# Patient Record
Sex: MALE | Race: WHITE | NOT HISPANIC OR LATINO | URBAN - METROPOLITAN AREA
[De-identification: names, ages, dates, MRNs, and addresses within clinical notes are randomized per-mention and may not be internally consistent; named-entity substitution may affect disease eponyms.]

---

## 2022-10-04 ENCOUNTER — APPOINTMENT (RX ONLY)
Dept: URBAN - METROPOLITAN AREA CLINIC 143 | Facility: CLINIC | Age: 77
Setting detail: DERMATOLOGY
End: 2022-10-04

## 2022-10-04 DIAGNOSIS — L28.1 PRURIGO NODULARIS: ICD-10-CM

## 2022-10-04 DIAGNOSIS — L29.89 OTHER PRURITUS: ICD-10-CM | Status: INADEQUATELY CONTROLLED

## 2022-10-04 PROBLEM — L29.8 OTHER PRURITUS: Status: ACTIVE | Noted: 2022-10-04

## 2022-10-04 PROCEDURE — ? COUNSELING

## 2022-10-04 PROCEDURE — ? PRESCRIPTION MEDICATION MANAGEMENT

## 2022-10-04 PROCEDURE — 99214 OFFICE O/P EST MOD 30 MIN: CPT

## 2022-10-04 PROCEDURE — ? PRESCRIPTION

## 2022-10-04 PROCEDURE — ? ADDITIONAL NOTES

## 2022-10-04 PROCEDURE — ? DIAGNOSIS COMMENT

## 2022-10-04 RX ORDER — HYDROXYZINE HYDROCHLORIDE 25 MG/1
1-2 TABLET, FILM COATED ORAL QHS
Qty: 60 | Refills: 2 | Status: ERX | COMMUNITY
Start: 2022-10-04

## 2022-10-04 RX ADMIN — HYDROXYZINE HYDROCHLORIDE 1-2: 25 TABLET, FILM COATED ORAL at 00:00

## 2022-10-04 ASSESSMENT — LOCATION SIMPLE DESCRIPTION DERM: LOCATION SIMPLE: LEFT FOREARM

## 2022-10-04 ASSESSMENT — LOCATION DETAILED DESCRIPTION DERM: LOCATION DETAILED: LEFT VENTRAL PROXIMAL FOREARM

## 2022-10-04 ASSESSMENT — LOCATION ZONE DERM: LOCATION ZONE: ARM

## 2022-10-04 NOTE — PROCEDURE: ADDITIONAL NOTES
Detail Level: Simple
Render Risk Assessment In Note?: no
Additional Notes: [email or web address] info printed and provided to pt. Recommended ice or frozen peas to area for comfort. Recommended cooler showers. Reviewed option for management by neurologist with gabapentin if interfering with his life. Pt declines at this time.

## 2022-10-04 NOTE — PROCEDURE: DIAGNOSIS COMMENT
Detail Level: Generalized
Render Risk Assessment In Note?: no
Comment: Pt states he went to urgent care for intense itching and was prescribed triamcinolone for area. Here today for follow up .

## 2022-10-04 NOTE — PROCEDURE: PRESCRIPTION MEDICATION MANAGEMENT
Plan: Hydroxyzine 1-2 tabs qhs for itching advised may make you sleepy , no driving.
Render In Strict Bullet Format?: No
Detail Level: Zone

## 2022-10-04 NOTE — PROCEDURE: MIPS QUALITY
Detail Level: Detailed
Quality 111:Pneumonia Vaccination Status For Older Adults: Pneumococcal vaccine (PPSV23) administered on or after patientâs 60th birthday and before the end of the measurement period

## 2024-05-21 ENCOUNTER — APPOINTMENT (RX ONLY)
Dept: URBAN - METROPOLITAN AREA CLINIC 146 | Facility: CLINIC | Age: 79
Setting detail: DERMATOLOGY
End: 2024-05-21

## 2024-05-21 DIAGNOSIS — D49.2 NEOPLASM OF UNSPECIFIED BEHAVIOR OF BONE, SOFT TISSUE, AND SKIN: ICD-10-CM

## 2024-05-21 DIAGNOSIS — L82.1 OTHER SEBORRHEIC KERATOSIS: ICD-10-CM

## 2024-05-21 PROCEDURE — ? BIOPSY BY SHAVE METHOD

## 2024-05-21 PROCEDURE — ? COUNSELING

## 2024-05-21 PROCEDURE — 11102 TANGNTL BX SKIN SINGLE LES: CPT

## 2024-05-21 PROCEDURE — 99212 OFFICE O/P EST SF 10 MIN: CPT | Mod: 25

## 2024-05-21 ASSESSMENT — LOCATION SIMPLE DESCRIPTION DERM
LOCATION SIMPLE: LEFT NOSE
LOCATION SIMPLE: LEFT PRETIBIAL REGION

## 2024-05-21 ASSESSMENT — LOCATION DETAILED DESCRIPTION DERM
LOCATION DETAILED: LEFT NASAL ALA
LOCATION DETAILED: LEFT PROXIMAL PRETIBIAL REGION

## 2024-05-21 ASSESSMENT — LOCATION ZONE DERM
LOCATION ZONE: NOSE
LOCATION ZONE: LEG

## 2024-05-21 NOTE — PROCEDURE: BIOPSY BY SHAVE METHOD
Detail Level: Detailed
Depth Of Biopsy: dermis
Was A Bandage Applied: Yes
Size Of Lesion In Cm: 0.4
X Size Of Lesion In Cm: 0
Anticipated Plan (Based On Presumed Biopsy Results): Ebt
Biopsy Type: H and E
Biopsy Method: double edge Personna blade
Anesthesia Type: 1% lidocaine without epinephrine
Anesthesia Volume In Cc: 0.5
Hemostasis: Aluminum Chloride
Wound Care: Aquaphor
Dressing: bandage
Destruction After The Procedure: No
Type Of Destruction Used: Curettage
Curettage Text: The wound bed was treated with curettage after the biopsy was performed.
Cryotherapy Text: The wound bed was treated with cryotherapy after the biopsy was performed.
Electrodesiccation Text: The wound bed was treated with electrodesiccation after the biopsy was performed.
Electrodesiccation And Curettage Text: The wound bed was treated with electrodesiccation and curettage after the biopsy was performed.
Silver Nitrate Text: The wound bed was treated with silver nitrate after the biopsy was performed.
Lab: -[id number]
Consent: Written consent was obtained and risks were reviewed including but not limited to scarring, infection, bleeding, scabbing, incomplete removal, nerve damage and allergy to anesthesia.
Post-Care Instructions: I reviewed with the patient in detail post-care instructions. Patient is to keep the biopsy site dry overnight, and then apply bacitracin twice daily until healed. Patient may apply hydrogen peroxide soaks to remove any crusting.
Notification Instructions: Patient will be notified of biopsy results. However, patient instructed to call the office if not contacted within 2 weeks.
Billing Type: Third-Party Bill
Information: Selecting Yes will display possible errors in your note based on the variables you have selected. This validation is only offered as a suggestion for you. PLEASE NOTE THAT THE VALIDATION TEXT WILL BE REMOVED WHEN YOU FINALIZE YOUR NOTE. IF YOU WANT TO FAX A PRELIMINARY NOTE YOU WILL NEED TO TOGGLE THIS TO 'NO' IF YOU DO NOT WANT IT IN YOUR FAXED NOTE.

## 2024-06-11 ENCOUNTER — APPOINTMENT (RX ONLY)
Dept: URBAN - METROPOLITAN AREA CLINIC 146 | Facility: CLINIC | Age: 79
Setting detail: DERMATOLOGY
End: 2024-06-11

## 2024-06-11 PROBLEM — C44.311 BASAL CELL CARCINOMA OF SKIN OF NOSE: Status: ACTIVE | Noted: 2024-06-11

## 2024-06-11 PROCEDURE — 99214 OFFICE O/P EST MOD 30 MIN: CPT

## 2024-06-11 PROCEDURE — ? PATIENT SPECIFIC COUNSELING

## 2024-06-11 PROCEDURE — ? CONSULTATION FOR ELECTRON BEAM THERAPY

## 2024-06-11 NOTE — HPI: BIOPSY PROVEN BCC
How Severe Is Your Bcc?: moderate
When Was The Bcc Biopsied? (Optional): on 5/21/24
Accession # Optional): [id number]

## 2024-06-11 NOTE — PROCEDURE: CONSULTATION FOR ELECTRON BEAM THERAPY
Referring Provider: Sergio Otero DO
Neck: No
Size Of Lesion: 1.2
Other Plan: We discussed a 5 to 6 week treatment plan. The patient selected a 6 week regimen
X Size Of Lesion In Cm (Optional): 1.1
Detail Level: Detailed

## 2024-07-30 ENCOUNTER — APPOINTMENT (RX ONLY)
Dept: URBAN - METROPOLITAN AREA CLINIC 146 | Facility: CLINIC | Age: 79
Setting detail: DERMATOLOGY
End: 2024-07-30

## 2024-07-30 PROBLEM — C44.311 BASAL CELL CARCINOMA OF SKIN OF NOSE: Status: ACTIVE | Noted: 2024-07-30

## 2024-07-30 PROCEDURE — ? INITIAL COMPLEX SIMULATION

## 2024-07-30 PROCEDURE — 77290 THER RAD SIMULAJ FIELD CPLX: CPT

## 2024-07-30 PROCEDURE — ? CLINICAL TREATMENT PLAN FOR RADIATION THERAPY

## 2024-07-30 NOTE — PROCEDURE: INITIAL COMPLEX SIMULATION
Location Override (Location Will Render As Ema Body Location If Left Blank): Left Nasal Ala
Dosing Schedule: 5 days a week
Daily Dose (Include Units): 200cGy
Detail Level: Detailed
Pathology: Use Selected EMA Impression
Closing Statement (Will Not Render If Left Blank): The patient tolerated the simulation well and appears to have had all of their questions answered to their satisfaction. The patient will return for field verification, simple simulation before radiation is delivered to confirm patient positioning and block shaping and positioning.
Bolus Thickness In Cm: 0.5
Acetate Template Statement (Will Not Render If Left Blank): An acetate template will be used to fabricate a custom lead shielding device to allow for lead on skin collimation. This type of shielding will best limit beam penumbra. Additional shielding will also be added to protect adjacent strutures.
Intro Statement For Treatment Plan (Will Not Render If Left Blank): Records, reports, pathology, and physical exam have been completed, interpreted and reviewed to assist in defining the course of radiation therapy treatment. Parameters interpreted include tumor histology, size, location, extent of disease and prior medical history. These factors will integrate into radiation field design. A complex electron beam simulation is necessary to accomplish a reproducible beam arrangement, field size and target volume with which to treat the tumor. Beam modifying devices will be designed and utilized as necessary to optimize the treatment and to best spare normal tissues. Complex blocking will be performed to minimize radiation scatter (penumbra), shape to target volume, and to best protect adjacent and underlying normal tissues. Fields will be verified on the patient prior to radiation delivery.
Custom Bolus Type: custom mixed, molded, and shaped
Energy In Mev: 6
Eye Shield Statement (Will Not Render If Left Blank): External eye shields will be placed daily to limit scatter dose to the lenses of the eyes. Customized eye shielding is safer than reusable eye shielding.  We therefore will custom design and fabricate bilateral eye shields made of shaped lead and covered by wax, for each of our patients. These will be used only during the course of treatment and then destroyed and constitute a complex fabrication process and device.
Cummulative Dose (Include Units): 6000cGy
Isodose: 90
Send Clinical Treatment Planning Code To Pm?: No - Documentation Only
Treatment Length (Include Units): 6 weeks
Intro Statement (Will Not Render If Left Blank): I then designed a radiation field to include the gross lesion (GTV) with additional margin that accounted for both potential subclinical microscopic extension (CTV), as well as for the beam characteristics of superficial electrons (PTV). Care was taken in designing the field near adjacent normal tissue structures. The target volume was drawn on the skin surface with a permanent marker and then the design with reference marks was carefully traced onto an acetate template. Digital photographs were taken.
Use Custom Eyeshields: Yes
Position: supine

## 2024-07-30 NOTE — PROCEDURE: CLINICAL TREATMENT PLAN FOR RADIATION THERAPY
Daily Dose (Include Units): 200cGy
Location Override (Location Will Render As Ema Body Location If Left Blank): Left Nasal Ala
Pathology: Use Selected EMA Impression
Intro Statement (Will Not Render If Left Blank): Records, reports, pathology, and physical exam have been completed, interpreted and reviewed to assist in defining the course of radiation therapy treatment. Parameters interpreted include tumor histology, size, location, extent of disease and prior medical history. These factors will integrate into radiation field design. A complex electron beam simulation is necessary to accomplish a reproducible beam arrangement, field size and target volume with which to treat the tumor. Beam modifying devices will be designed and utilized as necessary to optimize the treatment and to best spare normal tissues. Complex blocking will be performed to minimize radiation scatter (penumbra), shape to target volume, and to best protect adjacent and underlying normal tissues. Fields will be verified on the patient prior to radiation delivery.
Cummulative Dose (Include Units): 6000cGy
Energy In Mev: 6
Which Code: 00105
Send Cpt Codes To Pm?: No
Detail Level: Detailed
Position: supine
Treatment Length (Include Units): 6 weeks
Dosing Schedule: 5 days a week

## 2024-08-02 ENCOUNTER — APPOINTMENT (RX ONLY)
Dept: URBAN - METROPOLITAN AREA CLINIC 146 | Facility: CLINIC | Age: 79
Setting detail: DERMATOLOGY
End: 2024-08-02

## 2024-08-02 PROBLEM — C44.311 BASAL CELL CARCINOMA OF SKIN OF NOSE: Status: ACTIVE | Noted: 2024-08-02

## 2024-08-02 PROCEDURE — ? SIMPLE SIMULATION - BLOCK CHECK

## 2024-08-02 PROCEDURE — 77280 THER RAD SIMULAJ FIELD SMPL: CPT

## 2024-08-02 NOTE — PROCEDURE: SIMPLE SIMULATION - BLOCK CHECK
Bolus Thickness In Cm: Next 0.5
Custom Bolus Type: custom mixed, molded, and shaped
Detail Level: Simple
Closing Statement (Will Not Render If Left Blank): Working with the physician, the therapist adjusted the machine gantry, table, and collimator and adjusted patient positioning to allow an appositional electron beam. SSD measurements were verified using the projected optical distance indicator light and room lasers. The field was positioned at 100cm SSD to the top of the bolus material. The machine parameters were recorded. The treatment light field was photographed projected onto the patient's skin. Further digital photographs were taken and will be used as a reference. The patient tolerated the simple simulation well and appears to have had all of their questions answered to their satisfaction.
Location Override (Location Will Render As Ema Body Location If Left Blank): Left Nasal Ala
Position: supine

## 2024-08-05 ENCOUNTER — APPOINTMENT (RX ONLY)
Dept: URBAN - METROPOLITAN AREA CLINIC 146 | Facility: CLINIC | Age: 79
Setting detail: DERMATOLOGY
End: 2024-08-05

## 2024-08-05 DIAGNOSIS — Z41.9 ENCOUNTER FOR PROCEDURE FOR PURPOSES OTHER THAN REMEDYING HEALTH STATE, UNSPECIFIED: ICD-10-CM

## 2024-08-05 PROCEDURE — ? SIGNATURE HYDRAFACIAL

## 2024-08-05 ASSESSMENT — LOCATION ZONE DERM: LOCATION ZONE: FACE

## 2024-08-05 ASSESSMENT — LOCATION SIMPLE DESCRIPTION DERM: LOCATION SIMPLE: LEFT CHEEK

## 2024-08-05 ASSESSMENT — LOCATION DETAILED DESCRIPTION DERM: LOCATION DETAILED: LEFT SUPERIOR CENTRAL MALAR CHEEK

## 2024-08-05 NOTE — PROCEDURE: SIGNATURE HYDRAFACIAL
Procedure: Exfoliation
Solution Override
Vacuum Pressure High Setting (Will Not Render If Set To 0): 0
Post-Care Instructions: I reviewed with the patient in detail post-care instructions. Patient should stay away from the sun and wear sun protection until treated areas are fully healed.
Procedure: Fusion
Location: face
Treatment Number: 1
Tip: Hydropeel Tip, Teal
Solution: GlySal 7.5%
Procedure: Extraction
Tip: Hydropeel Tip, Blue
Solution: Activ-4
Tip: Hydropeel Tip, Clear
Procedure: Extend and Protect
Price (Use Numbers Only, No Special Characters Or $): 189
Solution: Beta-HD
Procedure: Boost
Tip Override
Indication: prominent pores
Procedure: Peel
Consent: Written consent obtained, risks reviewed including but not limited to crusting, scabbing, blistering, scarring, darker or lighter pigmentary change, bruising, and/or incomplete response.

## 2024-08-30 ENCOUNTER — APPOINTMENT (RX ONLY)
Dept: URBAN - METROPOLITAN AREA CLINIC 146 | Facility: CLINIC | Age: 79
Setting detail: DERMATOLOGY
End: 2024-08-30

## 2024-08-30 DIAGNOSIS — Z41.9 ENCOUNTER FOR PROCEDURE FOR PURPOSES OTHER THAN REMEDYING HEALTH STATE, UNSPECIFIED: ICD-10-CM

## 2024-08-30 PROCEDURE — ? SIGNATURE HYDRAFACIAL

## 2024-08-30 ASSESSMENT — LOCATION DETAILED DESCRIPTION DERM: LOCATION DETAILED: LEFT CENTRAL MALAR CHEEK

## 2024-08-30 ASSESSMENT — LOCATION SIMPLE DESCRIPTION DERM: LOCATION SIMPLE: LEFT CHEEK

## 2024-08-30 ASSESSMENT — LOCATION ZONE DERM: LOCATION ZONE: FACE

## 2024-08-30 NOTE — PROCEDURE: SIGNATURE HYDRAFACIAL
Number Of Passes: 0
Tip: Hydropeel Tip, Teal
Tip: Hydropeel Tip, Blue
Tip: Hydropeel Tip, Clear
Consent: Written consent obtained, risks reviewed including but not limited to crusting, scabbing, blistering, scarring, darker or lighter pigmentary change, bruising, and/or incomplete response.
Tip Override
Procedure: Extraction
Procedure: Exfoliation
Post-Care Instructions: I reviewed with the patient in detail post-care instructions. Patient should stay away from the sun and wear sun protection until treated areas are fully healed.
Procedure: Peel
Procedure: Boost
Procedure: Fusion
Procedure: Extend and Protect
Solution: Activ-4
Location: face
Price (Use Numbers Only, No Special Characters Or $): 189
Solution: Beta-HD
Solution: GlySal 7.5%
Indication: prominent pores
Solution Override
Treatment Number: 1

## 2024-09-12 ENCOUNTER — APPOINTMENT (RX ONLY)
Dept: URBAN - METROPOLITAN AREA CLINIC 146 | Facility: CLINIC | Age: 79
Setting detail: DERMATOLOGY
End: 2024-09-12

## 2024-09-12 PROBLEM — C44.311 BASAL CELL CARCINOMA OF SKIN OF NOSE: Status: ACTIVE | Noted: 2024-09-12

## 2024-09-12 PROCEDURE — ? ELECTRON BEAM THERAPY

## 2024-09-12 NOTE — PROCEDURE: ELECTRON BEAM THERAPY
Date Of Fraction 5: 09/18/2024
Mild, Moderate, Brisk Erythema Or Dry Desquamation Counseling: The patient was instructed in meticulous wound care and counseled on potential and expected side effects of treatment and reasonable expectations for the time to heal. They appeared to have all of their questions answered to their satisfaction. They were recommended to rinse the treatment site with mild soap and water (recommended Dove, Neutrogena or Cetaphil). After rinsing the treatment site twice a day they are to apply a pea-sized amount of Aquaphor healing ointment twice daily. Aquaphor samples were provided. The patient understands to call with any questions, concerns or difficulties. They were informed of the potentital for infection and counseled on common signs and symptoms of infection including skin redness extending outside of the treatment area, enlargement or skin breakdown, significant warmth, pain, fever or chills or sweats. They voiced an understanding to contact us immediately if any of these symptoms develop. Their follow up appointment was scheduled. They were also instructed to follow-up with dermatology for skin cancer surveillance.
Detail Level: Detailed
Dimensions-X Axis In Cm: 0
Early, Moist Desquamation Counseling: The patient was instructed in meticulous wound care and counseled on potential and expected side effects of treatment and reasonable expectations for the time to heal. They appeared to have all of their questions answered to their satisfaction. They were recommended to cleanse the treatment site with dilute hydrogen peroxide and water (using 50:50 ratio). They were told how to apply the solution gently with a cotton ball twice daily. After cleaning, they were instructed to pat the area dry with a soft cloth and then apply a small amount of Silvadene 1% cream twice daily. They were told to return to the clinic in 7 days for re-evaluation. The patient understands to call with any questions, concerns or difficulties. They were informed of the potentital for infection and counseled on common signs and symptoms of infection including skin redness extending outside of the treatment area, enlargement or skin breakdown, significant warmth, pain, fever or chills or sweats. They voiced an understanding to contact us immediately if any of these symptoms develop. Their follow up appointment was scheduled. They were also instructed to follow-up with dermatology for skin cancer surveillance.
Total Rx Dosage: [id number]
Ebt Cpt Code (Please Add Code Details Below): 
Send Cpt Codes To Pm?: No
Radiation Units: cGy
Date Of Fraction 1: 09/12/2024
Total Planned Fractions: 30
Date Of Fraction 2: 09/13/2024
Daily Dosage Administered: 200
Date Of Fraction 3: 09/16/2024
Location Override (Location Will Render As Ema Body Location If Left Blank): left nasal ala
Assessment: Appropriate reaction
Date Of Fraction 4: 09/17/2024

## 2024-09-16 ENCOUNTER — APPOINTMENT (RX ONLY)
Dept: URBAN - METROPOLITAN AREA CLINIC 146 | Facility: CLINIC | Age: 79
Setting detail: DERMATOLOGY
End: 2024-09-16

## 2024-09-16 DIAGNOSIS — D49.2 NEOPLASM OF UNSPECIFIED BEHAVIOR OF BONE, SOFT TISSUE, AND SKIN: ICD-10-CM

## 2024-09-16 PROCEDURE — 11102 TANGNTL BX SKIN SINGLE LES: CPT

## 2024-09-16 PROCEDURE — ? BIOPSY BY SHAVE METHOD

## 2024-09-16 PROCEDURE — ? COUNSELING

## 2024-09-16 ASSESSMENT — LOCATION ZONE DERM: LOCATION ZONE: LEG

## 2024-09-16 ASSESSMENT — LOCATION SIMPLE DESCRIPTION DERM: LOCATION SIMPLE: LEFT THIGH

## 2024-09-16 ASSESSMENT — LOCATION DETAILED DESCRIPTION DERM: LOCATION DETAILED: LEFT ANTERIOR PROXIMAL THIGH

## 2024-09-16 NOTE — PROCEDURE: BIOPSY BY SHAVE METHOD
Detail Level: Detailed
Depth Of Biopsy: dermis
Was A Bandage Applied: Yes
Size Of Lesion In Cm: 0.3
X Size Of Lesion In Cm: 0
Biopsy Type: H and E
Biopsy Method: Dermablade
Anesthesia Type: 1% lidocaine with epinephrine
Anesthesia Volume In Cc: 0.5
Hemostasis: Drysol
Wound Care: Petrolatum
Dressing: bandage
Destruction After The Procedure: No
Type Of Destruction Used: Curettage
Curettage Text: The wound bed was treated with curettage after the biopsy was performed.
Cryotherapy Text: The wound bed was treated with cryotherapy after the biopsy was performed.
Electrodesiccation Text: The wound bed was treated with electrodesiccation after the biopsy was performed.
Electrodesiccation And Curettage Text: The wound bed was treated with electrodesiccation and curettage after the biopsy was performed.
Silver Nitrate Text: The wound bed was treated with silver nitrate after the biopsy was performed.
Lab: -[id number]
Consent: Written consent was obtained and risks were reviewed including but not limited to scarring, infection, bleeding, scabbing, incomplete removal, nerve damage and allergy to anesthesia.
Post-Care Instructions: I reviewed with the patient in detail post-care instructions. Patient is to keep the biopsy site dry overnight, and then apply bacitracin twice daily until healed. Patient may apply hydrogen peroxide soaks to remove any crusting.
Notification Instructions: Patient will be notified of biopsy results. However, patient instructed to call the office if not contacted within 2 weeks.
Billing Type: Third-Party Bill
Information: Selecting Yes will display possible errors in your note based on the variables you have selected. This validation is only offered as a suggestion for you. PLEASE NOTE THAT THE VALIDATION TEXT WILL BE REMOVED WHEN YOU FINALIZE YOUR NOTE. IF YOU WANT TO FAX A PRELIMINARY NOTE YOU WILL NEED TO TOGGLE THIS TO 'NO' IF YOU DO NOT WANT IT IN YOUR FAXED NOTE.

## 2024-09-19 ENCOUNTER — APPOINTMENT (RX ONLY)
Dept: URBAN - METROPOLITAN AREA CLINIC 146 | Facility: CLINIC | Age: 79
Setting detail: DERMATOLOGY
End: 2024-09-19

## 2024-09-19 PROBLEM — C44.311 BASAL CELL CARCINOMA OF SKIN OF NOSE: Status: ACTIVE | Noted: 2024-09-19

## 2024-09-19 PROCEDURE — ? ELECTRON BEAM THERAPY

## 2024-09-19 NOTE — PROCEDURE: ELECTRON BEAM THERAPY
Date Of Fraction 5: 09/18/2024
Mild, Moderate, Brisk Erythema Or Dry Desquamation Counseling: The patient was instructed in meticulous wound care and counseled on potential and expected side effects of treatment and reasonable expectations for the time to heal. They appeared to have all of their questions answered to their satisfaction. They were recommended to rinse the treatment site with mild soap and water (recommended Dove, Neutrogena or Cetaphil). After rinsing the treatment site twice a day they are to apply a pea-sized amount of Aquaphor healing ointment twice daily. Aquaphor samples were provided. The patient understands to call with any questions, concerns or difficulties. They were informed of the potentital for infection and counseled on common signs and symptoms of infection including skin redness extending outside of the treatment area, enlargement or skin breakdown, significant warmth, pain, fever or chills or sweats. They voiced an understanding to contact us immediately if any of these symptoms develop. Their follow up appointment was scheduled. They were also instructed to follow-up with dermatology for skin cancer surveillance.
Detail Level: Detailed
Date Of Fraction 7: 09/20/2024
Dimensions-X Axis In Cm: 0
Date Of Fraction 6: 09/19/2024
Early, Moist Desquamation Counseling: The patient was instructed in meticulous wound care and counseled on potential and expected side effects of treatment and reasonable expectations for the time to heal. They appeared to have all of their questions answered to their satisfaction. They were recommended to cleanse the treatment site with dilute hydrogen peroxide and water (using 50:50 ratio). They were told how to apply the solution gently with a cotton ball twice daily. After cleaning, they were instructed to pat the area dry with a soft cloth and then apply a small amount of Silvadene 1% cream twice daily. They were told to return to the clinic in 7 days for re-evaluation. The patient understands to call with any questions, concerns or difficulties. They were informed of the potentital for infection and counseled on common signs and symptoms of infection including skin redness extending outside of the treatment area, enlargement or skin breakdown, significant warmth, pain, fever or chills or sweats. They voiced an understanding to contact us immediately if any of these symptoms develop. Their follow up appointment was scheduled. They were also instructed to follow-up with dermatology for skin cancer surveillance.
Date Of Fraction 8: 09/23/2024
Total Rx Dosage: 6000
Date Of Fraction 9: 09/24/2024
Ebt Cpt Code (Please Add Code Details Below): 
Send Cpt Codes To Pm?: No
Radiation Units: cGy
Date Of Fraction 1: 09/12/2024
Date Of Fraction 10: 09/25/2024
Total Planned Fractions: 30
Date Of Fraction 2: 09/13/2024
Daily Dosage Administered: 200
Date Of Fraction 3: 09/16/2024
Location Override (Location Will Render As Ema Body Location If Left Blank): left nasal ala
Assessment: Appropriate reaction
Date Of Fraction 4: 09/17/2024

## 2024-09-20 ENCOUNTER — APPOINTMENT (RX ONLY)
Dept: URBAN - METROPOLITAN AREA CLINIC 146 | Facility: CLINIC | Age: 79
Setting detail: DERMATOLOGY
End: 2024-09-20

## 2024-09-20 PROBLEM — C44.311 BASAL CELL CARCINOMA OF SKIN OF NOSE: Status: ACTIVE | Noted: 2024-09-20

## 2024-09-20 PROCEDURE — ? COMPLEX SIMULATION - REDUCED FIELD

## 2024-09-20 PROCEDURE — 77290 THER RAD SIMULAJ FIELD CPLX: CPT

## 2024-09-20 NOTE — PROCEDURE: COMPLEX SIMULATION - REDUCED FIELD
Bolus Thickness In Cm: .8
Energy In Mev: 6
Position: supine
Location Override (Location Will Render As Ema Body Location If Left Blank): left nasal ala
Detail Level: Simple
Intro Statement (Will Not Render If Left Blank): A new radiation electron beam field was designed to include the gross lesion (GTV) with additional margin that accounted for both potential subclinical microscopic extension (CTV), as well as for the beam characteristics of superficial electrons (PTV). This field took into account the changes in the volume of the tumor that have occurred during radiation therapy. Care was taken in designing the field near adjacent normal tissue structures. The target volume was drawn on the skin surface with a permanent marker and then the design with reference marks was carefully traced onto an acetate template. Digital photographs were taken.
Isodose: 90
Custom Bolus Type: custom mixed, molded, and shaped
Closing Statement (Will Not Render If Left Blank): The patient tolerated the complex electron beam simulation well and will continue on radiotherapy using the modified field. The patient will return for a field verification simulation before radiation is delivered to confirm patient positioning and block fabrication parameters.
Pathology: Use Selected EMA Impression
Acetate Template Statement (Will Not Render If Left Blank): The acetate template will be used to fabricate a custom lead on skin shielding device to allow for lead on skin collimation. This type of shielding will best limit beam penumbra and define the field.

## 2024-09-26 ENCOUNTER — APPOINTMENT (RX ONLY)
Dept: URBAN - METROPOLITAN AREA CLINIC 146 | Facility: CLINIC | Age: 79
Setting detail: DERMATOLOGY
End: 2024-09-26

## 2024-09-26 PROBLEM — C44.311 BASAL CELL CARCINOMA OF SKIN OF NOSE: Status: ACTIVE | Noted: 2024-09-26

## 2024-09-26 PROCEDURE — ? ELECTRON BEAM THERAPY

## 2024-09-26 NOTE — PROCEDURE: ELECTRON BEAM THERAPY
Date Of Fraction 15: 10/02/2024
Date Of Fraction 14: 10/01/2024
Date Of Fraction 5: 09/18/2024
Mild, Moderate, Brisk Erythema Or Dry Desquamation Counseling: The patient was instructed in meticulous wound care and counseled on potential and expected side effects of treatment and reasonable expectations for the time to heal. They appeared to have all of their questions answered to their satisfaction. They were recommended to rinse the treatment site with mild soap and water (recommended Dove, Neutrogena or Cetaphil). After rinsing the treatment site twice a day they are to apply a pea-sized amount of Aquaphor healing ointment twice daily. Aquaphor samples were provided. The patient understands to call with any questions, concerns or difficulties. They were informed of the potentital for infection and counseled on common signs and symptoms of infection including skin redness extending outside of the treatment area, enlargement or skin breakdown, significant warmth, pain, fever or chills or sweats. They voiced an understanding to contact us immediately if any of these symptoms develop. Their follow up appointment was scheduled. They were also instructed to follow-up with dermatology for skin cancer surveillance.
Detail Level: Detailed
Date Of Fraction 7: 09/20/2024
Dimensions-X Axis In Cm: 0
Date Of Fraction 6: 09/19/2024
Early, Moist Desquamation Counseling: The patient was instructed in meticulous wound care and counseled on potential and expected side effects of treatment and reasonable expectations for the time to heal. They appeared to have all of their questions answered to their satisfaction. They were recommended to cleanse the treatment site with dilute hydrogen peroxide and water (using 50:50 ratio). They were told how to apply the solution gently with a cotton ball twice daily. After cleaning, they were instructed to pat the area dry with a soft cloth and then apply a small amount of Silvadene 1% cream twice daily. They were told to return to the clinic in 7 days for re-evaluation. The patient understands to call with any questions, concerns or difficulties. They were informed of the potentital for infection and counseled on common signs and symptoms of infection including skin redness extending outside of the treatment area, enlargement or skin breakdown, significant warmth, pain, fever or chills or sweats. They voiced an understanding to contact us immediately if any of these symptoms develop. Their follow up appointment was scheduled. They were also instructed to follow-up with dermatology for skin cancer surveillance.
Date Of Fraction 8: 09/23/2024
Total Rx Dosage: 6000
Date Of Fraction 9: 09/24/2024
Ebt Cpt Code (Please Add Code Details Below): 
Send Cpt Codes To Pm?: No
Radiation Units: cGy
Date Of Fraction 1: 09/12/2024
Date Of Fraction 10: 09/25/2024
Total Planned Fractions: 30
Date Of Fraction 11: 09/26/2024
Date Of Fraction 2: 09/13/2024
Daily Dosage Administered: 200
Date Of Fraction 3: 09/16/2024
Date Of Fraction 12: 09/27/2024
Location Override (Location Will Render As Ema Body Location If Left Blank): left nasal ala
Assessment: Appropriate reaction
Date Of Fraction 13: 09/30/2024
Date Of Fraction 4: 09/17/2024

## 2024-09-27 ENCOUNTER — APPOINTMENT (RX ONLY)
Dept: URBAN - METROPOLITAN AREA CLINIC 146 | Facility: CLINIC | Age: 79
Setting detail: DERMATOLOGY
End: 2024-09-27

## 2024-09-27 PROBLEM — C44.311 BASAL CELL CARCINOMA OF SKIN OF NOSE: Status: ACTIVE | Noted: 2024-09-27

## 2024-09-27 PROCEDURE — 77280 THER RAD SIMULAJ FIELD SMPL: CPT

## 2024-09-27 PROCEDURE — ? SIMPLE SIMULATION - BLOCK CHECK

## 2024-09-27 NOTE — PROCEDURE: SIMPLE SIMULATION - BLOCK CHECK
Detail Level: Simple
Closing Statement (Will Not Render If Left Blank): Working with the physician, the therapist adjusted the machine gantry, table, and collimator and adjusted patient positioning to allow an appositional electron beam. SSD measurements were verified using the projected optical distance indicator light and room lasers. The field was positioned at 100cm SSD to the top of the bolus material. The machine parameters were recorded. The treatment light field was photographed projected onto the patient's skin. Further digital photographs were taken and will be used as a reference. The patient tolerated the simple simulation well and appears to have had all of their questions answered to their satisfaction.
Bolus Thickness In Cm: Next 0.8
Location Override (Location Will Render As Ema Body Location If Left Blank): Left nasal ala
Position: supine
Custom Bolus Type: custom mixed, molded, and shaped

## 2024-10-03 ENCOUNTER — APPOINTMENT (RX ONLY)
Dept: URBAN - METROPOLITAN AREA CLINIC 146 | Facility: CLINIC | Age: 79
Setting detail: DERMATOLOGY
End: 2024-10-03

## 2024-10-03 PROBLEM — C44.311 BASAL CELL CARCINOMA OF SKIN OF NOSE: Status: ACTIVE | Noted: 2024-10-03

## 2024-10-03 PROCEDURE — ? ELECTRON BEAM THERAPY

## 2024-10-03 NOTE — PROCEDURE: ELECTRON BEAM THERAPY
Date Of Fraction 15: 10/02/2024
Date Of Fraction 14: 10/01/2024
Date Of Fraction 19: 10/08/2024
Date Of Fraction 5: 09/18/2024
Mild, Moderate, Brisk Erythema Or Dry Desquamation Counseling: The patient was instructed in meticulous wound care and counseled on potential and expected side effects of treatment and reasonable expectations for the time to heal. They appeared to have all of their questions answered to their satisfaction. They were recommended to rinse the treatment site with mild soap and water (recommended Dove, Neutrogena or Cetaphil). After rinsing the treatment site twice a day they are to apply a pea-sized amount of Aquaphor healing ointment twice daily. Aquaphor samples were provided. The patient understands to call with any questions, concerns or difficulties. They were informed of the potentital for infection and counseled on common signs and symptoms of infection including skin redness extending outside of the treatment area, enlargement or skin breakdown, significant warmth, pain, fever or chills or sweats. They voiced an understanding to contact us immediately if any of these symptoms develop. Their follow up appointment was scheduled. They were also instructed to follow-up with dermatology for skin cancer surveillance.
Date Of Fraction 16: 10/03/2024
Detail Level: Detailed
Date Of Fraction 7: 09/20/2024
Dimensions-X Axis In Cm: 0
Date Of Fraction 6: 09/19/2024
Date Of Fraction 20: 10/09/2024
Early, Moist Desquamation Counseling: The patient was instructed in meticulous wound care and counseled on potential and expected side effects of treatment and reasonable expectations for the time to heal. They appeared to have all of their questions answered to their satisfaction. They were recommended to cleanse the treatment site with dilute hydrogen peroxide and water (using 50:50 ratio). They were told how to apply the solution gently with a cotton ball twice daily. After cleaning, they were instructed to pat the area dry with a soft cloth and then apply a small amount of Silvadene 1% cream twice daily. They were told to return to the clinic in 7 days for re-evaluation. The patient understands to call with any questions, concerns or difficulties. They were informed of the potentital for infection and counseled on common signs and symptoms of infection including skin redness extending outside of the treatment area, enlargement or skin breakdown, significant warmth, pain, fever or chills or sweats. They voiced an understanding to contact us immediately if any of these symptoms develop. Their follow up appointment was scheduled. They were also instructed to follow-up with dermatology for skin cancer surveillance.
Date Of Fraction 8: 09/23/2024
Total Rx Dosage: [id number]
Date Of Fraction 9: 09/24/2024
Ebt Cpt Code (Please Add Code Details Below): 
Send Cpt Codes To Pm?: No
Radiation Units: cGy
Date Of Fraction 17: 10/04/2024
Date Of Fraction 1: 09/12/2024
Date Of Fraction 10: 09/25/2024
Total Planned Fractions: 30
Date Of Fraction 11: 09/26/2024
Date Of Fraction 2: 09/13/2024
Daily Dosage Administered: 200
Date Of Fraction 3: 09/16/2024
Date Of Fraction 12: 09/27/2024
Location Override (Location Will Render As Ema Body Location If Left Blank): left nasal ala
Assessment: Appropriate reaction
Date Of Fraction 18: 10/07/2024
Date Of Fraction 13: 09/30/2024
Date Of Fraction 4: 09/17/2024

## 2024-10-04 ENCOUNTER — APPOINTMENT (RX ONLY)
Dept: URBAN - METROPOLITAN AREA CLINIC 146 | Facility: CLINIC | Age: 79
Setting detail: DERMATOLOGY
End: 2024-10-04

## 2024-10-04 PROBLEM — C44.311 BASAL CELL CARCINOMA OF SKIN OF NOSE: Status: ACTIVE | Noted: 2024-10-04

## 2024-10-04 PROCEDURE — ? COMPLEX SIMULATION - REDUCED FIELD

## 2024-10-04 PROCEDURE — 77290 THER RAD SIMULAJ FIELD CPLX: CPT

## 2024-10-04 NOTE — PROCEDURE: COMPLEX SIMULATION - REDUCED FIELD
Position: supine
Location Override (Location Will Render As Ema Body Location If Left Blank): left nasal ala
Detail Level: Simple
Acetate Template Statement (Will Not Render If Left Blank): The acetate template will be used to fabricate a custom lead on skin shielding device to allow for lead on skin collimation. This type of shielding will best limit beam penumbra and define the field.
Closing Statement (Will Not Render If Left Blank): The patient tolerated the complex electron beam simulation well and will continue on radiotherapy using the modified field. The patient will return for a field verification simulation before radiation is delivered to confirm patient positioning and block fabrication parameters.
Custom Bolus Type: custom mixed, molded, and shaped
Intro Statement (Will Not Render If Left Blank): A new radiation electron beam field was designed to include the gross lesion (GTV) with additional margin that accounted for both potential subclinical microscopic extension (CTV), as well as for the beam characteristics of superficial electrons (PTV). This field took into account the changes in the volume of the tumor that have occurred during radiation therapy. Care was taken in designing the field near adjacent normal tissue structures. The target volume was drawn on the skin surface with a permanent marker and then the design with reference marks was carefully traced onto an acetate template. Digital photographs were taken.
Pathology: Use Selected EMA Impression
Energy In Mev: 6
Bolus Thickness In Cm: .5
Isodose: 90

## 2024-10-10 ENCOUNTER — APPOINTMENT (RX ONLY)
Dept: URBAN - METROPOLITAN AREA CLINIC 146 | Facility: CLINIC | Age: 79
Setting detail: DERMATOLOGY
End: 2024-10-10

## 2024-10-10 PROBLEM — C44.311 BASAL CELL CARCINOMA OF SKIN OF NOSE: Status: ACTIVE | Noted: 2024-10-10

## 2024-10-10 PROCEDURE — ? ELECTRON BEAM THERAPY

## 2024-10-10 NOTE — PROCEDURE: ELECTRON BEAM THERAPY
Date Of Fraction 15: 10/02/2024
Date Of Fraction 14: 10/01/2024
Date Of Fraction 19: 10/08/2024
Date Of Fraction 5: 09/18/2024
Mild, Moderate, Brisk Erythema Or Dry Desquamation Counseling: The patient was instructed in meticulous wound care and counseled on potential and expected side effects of treatment and reasonable expectations for the time to heal. They appeared to have all of their questions answered to their satisfaction. They were recommended to rinse the treatment site with mild soap and water (recommended Dove, Neutrogena or Cetaphil). After rinsing the treatment site twice a day they are to apply a pea-sized amount of Aquaphor healing ointment twice daily. Aquaphor samples were provided. The patient understands to call with any questions, concerns or difficulties. They were informed of the potentital for infection and counseled on common signs and symptoms of infection including skin redness extending outside of the treatment area, enlargement or skin breakdown, significant warmth, pain, fever or chills or sweats. They voiced an understanding to contact us immediately if any of these symptoms develop. Their follow up appointment was scheduled. They were also instructed to follow-up with dermatology for skin cancer surveillance.
Date Of Fraction 16: 10/03/2024
Detail Level: Detailed
Date Of Fraction 7: 09/20/2024
Dimensions-X Axis In Cm: 0
Date Of Fraction 6: 09/19/2024
Date Of Fraction 20: 10/09/2024
Early, Moist Desquamation Counseling: The patient was instructed in meticulous wound care and counseled on potential and expected side effects of treatment and reasonable expectations for the time to heal. They appeared to have all of their questions answered to their satisfaction. They were recommended to cleanse the treatment site with dilute hydrogen peroxide and water (using 50:50 ratio). They were told how to apply the solution gently with a cotton ball twice daily. After cleaning, they were instructed to pat the area dry with a soft cloth and then apply a small amount of Silvadene 1% cream twice daily. They were told to return to the clinic in 7 days for re-evaluation. The patient understands to call with any questions, concerns or difficulties. They were informed of the potentital for infection and counseled on common signs and symptoms of infection including skin redness extending outside of the treatment area, enlargement or skin breakdown, significant warmth, pain, fever or chills or sweats. They voiced an understanding to contact us immediately if any of these symptoms develop. Their follow up appointment was scheduled. They were also instructed to follow-up with dermatology for skin cancer surveillance.
Date Of Fraction 21: 10/14/2024
Date Of Fraction 8: 09/23/2024
Total Rx Dosage: [id number]
Date Of Fraction 9: 09/24/2024
Ebt Cpt Code (Please Add Code Details Below): 
Send Cpt Codes To Pm?: No
Radiation Units: cGy
Date Of Fraction 22: 10/15/2024
Date Of Fraction 23: 10/16/2024
Date Of Fraction 17: 10/04/2024
Date Of Fraction 1: 09/12/2024
Date Of Fraction 10: 09/25/2024
Total Planned Fractions: 30
Date Of Fraction 11: 09/26/2024
Date Of Fraction 24: 10/17/2024
Date Of Fraction 2: 09/13/2024
Daily Dosage Administered: 200
Date Of Fraction 25: 10/18/2024
Date Of Fraction 3: 09/16/2024
Date Of Fraction 12: 09/27/2024
Location Override (Location Will Render As Ema Body Location If Left Blank): left nasal ala
Assessment: Appropriate reaction
Date Of Fraction 18: 10/07/2024
Date Of Fraction 13: 09/30/2024
Date Of Fraction 4: 09/17/2024

## 2024-10-14 ENCOUNTER — APPOINTMENT (RX ONLY)
Dept: URBAN - METROPOLITAN AREA CLINIC 146 | Facility: CLINIC | Age: 79
Setting detail: DERMATOLOGY
End: 2024-10-14

## 2024-10-14 PROBLEM — C44.529 SQUAMOUS CELL CARCINOMA OF SKIN OF OTHER PART OF TRUNK: Status: ACTIVE | Noted: 2024-10-14

## 2024-10-14 PROCEDURE — ? COUNSELING

## 2024-10-14 PROCEDURE — 17262 DSTRJ MAL LES T/A/L 1.1-2.0: CPT

## 2024-10-14 PROCEDURE — ? CURETTAGE AND DESTRUCTION

## 2024-10-14 NOTE — PROCEDURE: CURETTAGE AND DESTRUCTION
Body Location Override (Optional - Billing Will Still Be Based On Selected Body Map Location If Applicable): left anterior proximal thigh
Detail Level: Detailed
Number Of Curettages: 3
Size Of Lesion After Curettage: 1.2
Add Intralesional Injection: No
Concentration (Mg/Ml Or Millions Of Plaque Forming Units/Cc): 0.01
Total Volume (Ccs): 1
Anesthesia Type: 1% lidocaine with epinephrine
Anesthesia Volume In Cc: 2
Cautery Type: electrodesiccation
What Was Performed First?: Curettage
Final Size Statement: The size of the lesion after curettage but before electrodesiccation was
Additional Information: (Optional): The wound was cleaned, and a pressure dressing was applied.  The patient received detailed post-op instructions.
Consent was obtained from the patient. The risks, benefits and alternatives to therapy were discussed in detail. Specifically, the risks of infection, scarring, bleeding, prolonged wound healing, nerve injury, incomplete removal, allergy to anesthesia and recurrence were addressed. Alternatives to ED&C, such as: surgical removal and XRT were also discussed.  Prior to the procedure, the treatment site was clearly identified and confirmed by the patient. All components of Universal Protocol/PAUSE Rule completed.
Post-Care Instructions: I reviewed with the patient in detail post-care instructions. Patient is to keep the area dry for 48 hours, and not to engage in any swimming until the area is healed. Should the patient develop any fevers, chills, bleeding, severe pain patient will contact the office immediately.
Bill As A Line Item Or As Units: Line Item

## 2024-10-18 ENCOUNTER — APPOINTMENT (RX ONLY)
Dept: URBAN - METROPOLITAN AREA CLINIC 146 | Facility: CLINIC | Age: 79
Setting detail: DERMATOLOGY
End: 2024-10-18

## 2024-10-18 PROBLEM — C44.311 BASAL CELL CARCINOMA OF SKIN OF NOSE: Status: ACTIVE | Noted: 2024-10-18

## 2024-10-18 PROCEDURE — 77280 THER RAD SIMULAJ FIELD SMPL: CPT

## 2024-10-18 PROCEDURE — ? SIMPLE SIMULATION - BLOCK CHECK

## 2024-10-18 NOTE — PROCEDURE: SIMPLE SIMULATION - BLOCK CHECK
Custom Bolus Type: custom mixed, molded, and shaped
Detail Level: Simple
Location Override (Location Will Render As Ema Body Location If Left Blank): left nasal ala
Bolus Thickness In Cm: Next 0.5
Closing Statement (Will Not Render If Left Blank): Working with the physician, the therapist adjusted the machine gantry, table, and collimator and adjusted patient positioning to allow an appositional electron beam. SSD measurements were verified using the projected optical distance indicator light and room lasers. The field was positioned at 100cm SSD to the top of the bolus material. The machine parameters were recorded. The treatment light field was photographed projected onto the patient's skin. Further digital photographs were taken and will be used as a reference. The patient tolerated the simple simulation well and appears to have had all of their questions answered to their satisfaction.
Position: supine

## 2024-10-18 NOTE — PROCEDURE: SIMPLE SIMULATION - BLOCK CHECK
Closing Statement (Will Not Render If Left Blank): Working with the physician, the therapist adjusted the machine gantry, table, and collimator and adjusted patient positioning to allow an appositional electron beam. SSD measurements were verified using the projected optical distance indicator light and room lasers. The field was positioned at 100cm SSD to the top of the bolus material. The machine parameters were recorded. The treatment light field was photographed projected onto the patient's skin. Further digital photographs were taken and will be used as a reference. The patient tolerated the simple simulation well and appears to have had all of their questions answered to their satisfaction.
Position: supine
Detail Level: Simple
Location Override (Location Will Render As Ema Body Location If Left Blank): left nasal ala
Custom Bolus Type: custom mixed, molded, and shaped
Bolus Thickness In Cm: Next 0.5

## 2024-10-21 ENCOUNTER — APPOINTMENT (RX ONLY)
Dept: URBAN - METROPOLITAN AREA CLINIC 146 | Facility: CLINIC | Age: 79
Setting detail: DERMATOLOGY
End: 2024-10-21

## 2024-10-21 PROBLEM — C44.311 BASAL CELL CARCINOMA OF SKIN OF NOSE: Status: ACTIVE | Noted: 2024-10-21

## 2024-10-21 PROCEDURE — ? ELECTRON BEAM THERAPY

## 2024-10-21 NOTE — PROCEDURE: ELECTRON BEAM THERAPY
Date Of Fraction 15: 10/02/2024
Skin Reaction?: expected skin reaction
Date Of Fraction 14: 10/01/2024
Date Of Fraction 19: 10/08/2024
Date Of Fraction 5: 09/18/2024
Mild, Moderate, Brisk Erythema Or Dry Desquamation Counseling: The patient was instructed in meticulous wound care and counseled on potential and expected side effects of treatment and reasonable expectations for the time to heal. They appeared to have all of their questions answered to their satisfaction. They were recommended to rinse the treatment site with mild soap and water (recommended Dove, Neutrogena or Cetaphil). After rinsing the treatment site twice a day they are to apply a pea-sized amount of Aquaphor healing ointment twice daily. Aquaphor samples were provided. The patient understands to call with any questions, concerns or difficulties. They were informed of the potentital for infection and counseled on common signs and symptoms of infection including skin redness extending outside of the treatment area, enlargement or skin breakdown, significant warmth, pain, fever or chills or sweats. They voiced an understanding to contact us immediately if any of these symptoms develop. Their follow up appointment was scheduled. They were also instructed to follow-up with dermatology for skin cancer surveillance.
Date Of Fraction 28: 10/23/2024
Date Of Fraction 29: 10/24/2024
Date Of Fraction 16: 10/03/2024
Detail Level: Detailed
Date Of Fraction 7: 09/20/2024
Dimensions-X Axis In Cm: 0
Date Of Fraction 6: 09/19/2024
Date Of Fraction 20: 10/09/2024
Early, Moist Desquamation Counseling: The patient was instructed in meticulous wound care and counseled on potential and expected side effects of treatment and reasonable expectations for the time to heal. They appeared to have all of their questions answered to their satisfaction. They were recommended to cleanse the treatment site with dilute hydrogen peroxide and water (using 50:50 ratio). They were told how to apply the solution gently with a cotton ball twice daily. After cleaning, they were instructed to pat the area dry with a soft cloth and then apply a small amount of Silvadene 1% cream twice daily. They were told to return to the clinic in 7 days for re-evaluation. The patient understands to call with any questions, concerns or difficulties. They were informed of the potentital for infection and counseled on common signs and symptoms of infection including skin redness extending outside of the treatment area, enlargement or skin breakdown, significant warmth, pain, fever or chills or sweats. They voiced an understanding to contact us immediately if any of these symptoms develop. Their follow up appointment was scheduled. They were also instructed to follow-up with dermatology for skin cancer surveillance.
Date Of Fraction 21: 10/14/2024
Date Of Fraction 8: 09/23/2024
Date Of Fraction 30: 10/25/2024
Total Rx Dosage: [id number]
Date Of Fraction 9: 09/24/2024
Lesion Regression?: 100
Ebt Cpt Code (Please Add Code Details Below): 
Send Cpt Codes To Pm?: No
Radiation Units: cGy
Date Of Fraction 22: 10/15/2024
Date Of Fraction 23: 10/16/2024
Date Of Fraction 17: 10/04/2024
Date Of Fraction 1: 09/12/2024
Is Therapy Completed?: Yes
Date Of Fraction 10: 09/25/2024
Total Planned Fractions: 30
Date Of Fraction 11: 09/26/2024
Date Of Fraction 24: 10/17/2024
How Was The Treatment Tolerated?: very well
Date Of Fraction 2: 09/13/2024
Daily Dosage Administered: 200
Clinical Recommendations: Skin recommendations for mild, moderate, brisk erythema or dry desquamation
Date Of Fraction 25: 10/18/2024
Date Of Fraction 26: 10/21/2024
Date Of Fraction 3: 09/16/2024
Date Of Fraction 12: 09/27/2024
Location Override (Location Will Render As Ema Body Location If Left Blank): left nasal ala
Assessment: Appropriate reaction
Date Of Fraction 18: 10/07/2024
Date Of Fraction 27: 10/22/2024
Date Of Fraction 13: 09/30/2024
Date Of Fraction 4: 09/17/2024

## 2024-11-13 ENCOUNTER — APPOINTMENT (RX ONLY)
Dept: URBAN - METROPOLITAN AREA CLINIC 146 | Facility: CLINIC | Age: 79
Setting detail: DERMATOLOGY
End: 2024-11-13

## 2025-04-12 ENCOUNTER — APPOINTMENT (OUTPATIENT)
Dept: URBAN - METROPOLITAN AREA CLINIC 146 | Facility: CLINIC | Age: 80
Setting detail: DERMATOLOGY
End: 2025-04-12

## 2025-04-12 DIAGNOSIS — L85.3 XEROSIS CUTIS: ICD-10-CM

## 2025-04-12 DIAGNOSIS — L82.1 OTHER SEBORRHEIC KERATOSIS: ICD-10-CM

## 2025-04-12 DIAGNOSIS — L57.8 OTHER SKIN CHANGES DUE TO CHRONIC EXPOSURE TO NONIONIZING RADIATION: ICD-10-CM

## 2025-04-12 DIAGNOSIS — L57.0 ACTINIC KERATOSIS: ICD-10-CM

## 2025-04-12 DIAGNOSIS — L81.4 OTHER MELANIN HYPERPIGMENTATION: ICD-10-CM

## 2025-04-12 DIAGNOSIS — D18.0 HEMANGIOMA: ICD-10-CM

## 2025-04-12 DIAGNOSIS — D22 MELANOCYTIC NEVI: ICD-10-CM

## 2025-04-12 PROBLEM — D18.01 HEMANGIOMA OF SKIN AND SUBCUTANEOUS TISSUE: Status: ACTIVE | Noted: 2025-04-12

## 2025-04-12 PROBLEM — D22.62 MELANOCYTIC NEVI OF LEFT UPPER LIMB, INCLUDING SHOULDER: Status: ACTIVE | Noted: 2025-04-12

## 2025-04-12 PROBLEM — D22.72 MELANOCYTIC NEVI OF LEFT LOWER LIMB, INCLUDING HIP: Status: ACTIVE | Noted: 2025-04-12

## 2025-04-12 PROBLEM — D22.71 MELANOCYTIC NEVI OF RIGHT LOWER LIMB, INCLUDING HIP: Status: ACTIVE | Noted: 2025-04-12

## 2025-04-12 PROBLEM — D22.61 MELANOCYTIC NEVI OF RIGHT UPPER LIMB, INCLUDING SHOULDER: Status: ACTIVE | Noted: 2025-04-12

## 2025-04-12 PROCEDURE — ? SUNSCREEN RECOMMENDATIONS

## 2025-04-12 PROCEDURE — ? LIQUID NITROGEN

## 2025-04-12 PROCEDURE — ? OTC TREATMENT REGIMEN

## 2025-04-12 PROCEDURE — ? COUNSELING

## 2025-04-12 PROCEDURE — 17003 DESTRUCT PREMALG LES 2-14: CPT

## 2025-04-12 PROCEDURE — 17000 DESTRUCT PREMALG LESION: CPT

## 2025-04-12 PROCEDURE — ? PRESCRIPTION MEDICATION MANAGEMENT

## 2025-04-12 PROCEDURE — 99214 OFFICE O/P EST MOD 30 MIN: CPT | Mod: 25

## 2025-04-12 ASSESSMENT — LOCATION SIMPLE DESCRIPTION DERM
LOCATION SIMPLE: RIGHT UPPER BACK
LOCATION SIMPLE: RIGHT ANKLE
LOCATION SIMPLE: RIGHT UPPER ARM
LOCATION SIMPLE: LEFT KNEE
LOCATION SIMPLE: LEFT POSTERIOR THIGH
LOCATION SIMPLE: LEFT LOWER BACK
LOCATION SIMPLE: RIGHT FOREARM
LOCATION SIMPLE: RIGHT BACK
LOCATION SIMPLE: RIGHT PRETIBIAL REGION
LOCATION SIMPLE: RIGHT CALF
LOCATION SIMPLE: LEFT UPPER BACK
LOCATION SIMPLE: CHEST
LOCATION SIMPLE: LEFT PRETIBIAL REGION
LOCATION SIMPLE: LEFT CALF
LOCATION SIMPLE: LEFT THIGH
LOCATION SIMPLE: ABDOMEN
LOCATION SIMPLE: LEFT ANTERIOR NECK
LOCATION SIMPLE: RIGHT LOWER BACK
LOCATION SIMPLE: LEFT UPPER ARM
LOCATION SIMPLE: LOWER BACK
LOCATION SIMPLE: RIGHT THIGH
LOCATION SIMPLE: LEFT FOREARM
LOCATION SIMPLE: RIGHT KNEE
LOCATION SIMPLE: INFERIOR FOREHEAD
LOCATION SIMPLE: CHEST
LOCATION SIMPLE: RIGHT POSTERIOR THIGH
LOCATION SIMPLE: RIGHT SHOULDER
LOCATION SIMPLE: LEFT ANKLE
LOCATION SIMPLE: LEFT SHOULDER

## 2025-04-12 ASSESSMENT — LOCATION ZONE DERM
LOCATION ZONE: FACE
LOCATION ZONE: LEG
LOCATION ZONE: TRUNK
LOCATION ZONE: TRUNK
LOCATION ZONE: ARM
LOCATION ZONE: NECK

## 2025-04-12 ASSESSMENT — LOCATION DETAILED DESCRIPTION DERM
LOCATION DETAILED: RIGHT POSTERIOR ANKLE
LOCATION DETAILED: RIGHT ANTERIOR DISTAL UPPER ARM
LOCATION DETAILED: RIGHT MEDIAL INFERIOR CHEST
LOCATION DETAILED: PERIUMBILICAL SKIN
LOCATION DETAILED: RIGHT ANKLE
LOCATION DETAILED: LEFT POSTERIOR ANKLE
LOCATION DETAILED: RIGHT MID-UPPER BACK
LOCATION DETAILED: LEFT ANKLE
LOCATION DETAILED: RIGHT KNEE
LOCATION DETAILED: RIGHT ANTERIOR SHOULDER
LOCATION DETAILED: LEFT ANTERIOR DISTAL THIGH
LOCATION DETAILED: RIGHT ANTERIOR PROXIMAL UPPER ARM
LOCATION DETAILED: LEFT ANTERIOR PROXIMAL UPPER ARM
LOCATION DETAILED: LEFT PROXIMAL PRETIBIAL REGION
LOCATION DETAILED: RIGHT SUPERIOR UPPER BACK
LOCATION DETAILED: LEFT DISTAL PRETIBIAL REGION
LOCATION DETAILED: RIGHT SUPERIOR LATERAL LOWER BACK
LOCATION DETAILED: RIGHT SUPERIOR LATERAL UPPER BACK
LOCATION DETAILED: LEFT SUPERIOR UPPER BACK
LOCATION DETAILED: LEFT VENTRAL DISTAL FOREARM
LOCATION DETAILED: RIGHT LATERAL SUPERIOR CHEST
LOCATION DETAILED: SUPERIOR LUMBAR SPINE
LOCATION DETAILED: RIGHT DISTAL DORSAL FOREARM
LOCATION DETAILED: LEFT ANTERIOR SHOULDER
LOCATION DETAILED: RIGHT DISTAL POSTERIOR THIGH
LOCATION DETAILED: RIGHT DISTAL PRETIBIAL REGION
LOCATION DETAILED: LEFT KNEE
LOCATION DETAILED: LEFT PROXIMAL DORSAL FOREARM
LOCATION DETAILED: LEFT PROXIMAL CALF
LOCATION DETAILED: RIGHT PROXIMAL DORSAL FOREARM
LOCATION DETAILED: LEFT MID-UPPER BACK
LOCATION DETAILED: RIGHT PROXIMAL CALF
LOCATION DETAILED: INFERIOR MID FOREHEAD
LOCATION DETAILED: RIGHT VENTRAL DISTAL FOREARM
LOCATION DETAILED: RIGHT ANTERIOR PROXIMAL THIGH
LOCATION DETAILED: LEFT ANTERIOR DISTAL UPPER ARM
LOCATION DETAILED: RIGHT PROXIMAL PRETIBIAL REGION
LOCATION DETAILED: LEFT DISTAL POSTERIOR THIGH
LOCATION DETAILED: RIGHT LATERAL SUPERIOR CHEST
LOCATION DETAILED: LEFT ANTERIOR PROXIMAL THIGH
LOCATION DETAILED: LEFT SUPERIOR LATERAL LOWER BACK
LOCATION DETAILED: LEFT DISTAL DORSAL FOREARM
LOCATION DETAILED: LEFT INFERIOR LATERAL NECK
LOCATION DETAILED: RIGHT ANTERIOR DISTAL THIGH
LOCATION DETAILED: LEFT LATERAL SUPERIOR CHEST

## 2025-04-12 NOTE — PROCEDURE: LIQUID NITROGEN
Show Aperture Variable?: Yes
Consent: The patient's consent was obtained including but not limited to risks of crusting, scabbing, blistering, scarring, darker or lighter pigmentary change, recurrence, incomplete removal and infection.
Application Tool (Optional): Cry-AC
Post-Care Instructions: I reviewed with the patient in detail post-care instructions. Patient is to wear sunprotection, and avoid picking at any of the treated lesions. Pt may apply Vaseline to crusted or scabbing areas.
Duration Of Freeze Thaw-Cycle (Seconds): 3
Number Of Freeze-Thaw Cycles: 1 freeze-thaw cycle
Render Post-Care Instructions In Note?: no
Detail Level: Detailed